# Patient Record
Sex: FEMALE | Race: WHITE | NOT HISPANIC OR LATINO | URBAN - METROPOLITAN AREA
[De-identification: names, ages, dates, MRNs, and addresses within clinical notes are randomized per-mention and may not be internally consistent; named-entity substitution may affect disease eponyms.]

---

## 2023-08-24 LAB — THYROTROPIN (MIU/L) IN SER/PLAS BY DETECTION LIMIT <= 0.05 MIU/L: 1.78 MIU/L (ref 0.44–3.98)

## 2023-08-25 LAB
DEHYDROEPIANDROSTERONE SULFATE (DHEA-S) (UG/DL) IN SER/: 351 UG/DL (ref 65–395)
ESTRADIOL (PG/ML) IN SER/PLAS: 82 PG/ML
FOLLITROPIN (IU/L) IN SER/PLAS: 3.6 IU/L
PROLACTIN (UG/L) IN SER/PLAS: 20.2 UG/L (ref 3–20)

## 2023-08-31 LAB
TESTOSTERONE FREE (CHAN): 3.3 PG/ML (ref 0.1–6.4)
TESTOSTERONE,TOTAL,LC-MS/MS: 29 NG/DL (ref 2–45)

## 2023-10-09 PROCEDURE — 87086 URINE CULTURE/COLONY COUNT: CPT

## 2023-10-10 ENCOUNTER — LAB REQUISITION (OUTPATIENT)
Dept: LAB | Facility: HOSPITAL | Age: 23
End: 2023-10-10
Payer: COMMERCIAL

## 2023-10-10 DIAGNOSIS — R35.0 FREQUENCY OF MICTURITION: ICD-10-CM

## 2023-10-10 DIAGNOSIS — R30.0 DYSURIA: ICD-10-CM

## 2023-10-11 LAB — BACTERIA UR CULT: NO GROWTH

## 2023-10-29 PROBLEM — N93.9 ABNORMAL UTERINE BLEEDING: Status: ACTIVE | Noted: 2023-10-29

## 2023-10-29 PROBLEM — N91.4 SECONDARY OLIGOMENORRHEA: Status: ACTIVE | Noted: 2023-10-29

## 2023-10-29 PROBLEM — J32.9 CLINICAL SINUSITIS: Status: ACTIVE | Noted: 2023-10-29

## 2023-10-29 PROBLEM — R79.89 ELEVATED PROLACTIN LEVEL: Status: ACTIVE | Noted: 2023-10-29

## 2023-10-29 RX ORDER — ADAPALENE AND BENZOYL PEROXIDE 3; 25 MG/G; MG/G
GEL TOPICAL
COMMUNITY
Start: 2019-09-18 | End: 2023-12-12 | Stop reason: ALTCHOICE

## 2023-10-29 RX ORDER — DAPSONE 75 MG/G
GEL TOPICAL
COMMUNITY
Start: 2019-09-18 | End: 2023-12-12 | Stop reason: ALTCHOICE

## 2023-10-29 RX ORDER — NORETHINDRONE ACETATE AND ETHINYL ESTRADIOL 1MG-20(21)
KIT ORAL
COMMUNITY
Start: 2020-08-21 | End: 2023-12-12 | Stop reason: ALTCHOICE

## 2023-12-10 ASSESSMENT — ENCOUNTER SYMPTOMS
ABDOMINAL PAIN: 1
ARTHRALGIAS: 0
CONSTIPATION: 0
FREQUENCY: 0
FLATUS: 0
HEMATOCHEZIA: 0
DYSURIA: 0
HEADACHES: 0
ANOREXIA: 0
VOMITING: 0
MYALGIAS: 0
BELCHING: 0
FEVER: 0
NAUSEA: 0
DIARRHEA: 0
HEMATURIA: 0
WEIGHT LOSS: 0

## 2023-12-12 ENCOUNTER — OFFICE VISIT (OUTPATIENT)
Dept: OBSTETRICS AND GYNECOLOGY | Facility: CLINIC | Age: 23
End: 2023-12-12
Payer: COMMERCIAL

## 2023-12-12 VITALS
DIASTOLIC BLOOD PRESSURE: 70 MMHG | WEIGHT: 146 LBS | HEIGHT: 64 IN | SYSTOLIC BLOOD PRESSURE: 110 MMHG | BODY MASS INDEX: 24.92 KG/M2

## 2023-12-12 DIAGNOSIS — N91.4 SECONDARY OLIGOMENORRHEA: Primary | ICD-10-CM

## 2023-12-12 PROCEDURE — 99213 OFFICE O/P EST LOW 20 MIN: CPT | Performed by: OBSTETRICS & GYNECOLOGY

## 2023-12-12 PROCEDURE — 1036F TOBACCO NON-USER: CPT | Performed by: OBSTETRICS & GYNECOLOGY

## 2023-12-12 RX ORDER — MEDROXYPROGESTERONE ACETATE 10 MG/1
TABLET ORAL
Qty: 30 TABLET | Refills: 3 | Status: SHIPPED | OUTPATIENT
Start: 2023-12-12

## 2023-12-12 ASSESSMENT — PAIN SCALES - GENERAL: PAINLEVEL: 0-NO PAIN

## 2023-12-12 NOTE — PROGRESS NOTES
"Assessment   IMPRESSIONS:    1. Secondary oligomenorrhea  - labs normal in 8/2023 except a mildly elevated prolactin level at 20.2. Recommend repeating a fasting prolactin level.   - no clear etiology for her oligomenorrhea. Discussed options for menstrual regulation. She does not wish to be on anything for contraception. She desires cyclic provera. Rx sent  - medroxyPROGESTERone (Provera) 10 mg tablet; Take 1 tablet by mouth for 10 days at the start of each month.  Dispense: 30 tablet; Refill: 3  - Prolactin; Future    Follow up for preventative visit or sooner PRN    Louisa Harry MD      Subjective   Etta Ellison is a 23 y.o. female here for follow up for irregular menses.    Patient had normal menses in July to October and then has not had one since October. She has had a negative pregnancy test. She reports this has been an ongoing issue whenever she is not on an OCP.     C/o isolated episode of pain during sex in August - lasted about 12 hours      Past medical history, surgical history, social history, family history, medications and allergies were reviewed and edited as needed      Objective   /70   Ht 1.626 m (5' 4\")   Wt 66.2 kg (146 lb)   LMP 10/15/2023   BMI 25.06 kg/m²   No exam performed today      "

## 2025-05-05 ENCOUNTER — APPOINTMENT (OUTPATIENT)
Dept: PRIMARY CARE | Facility: CLINIC | Age: 25
End: 2025-05-05
Payer: COMMERCIAL

## 2025-06-26 ENCOUNTER — APPOINTMENT (OUTPATIENT)
Dept: OBSTETRICS AND GYNECOLOGY | Facility: CLINIC | Age: 25
End: 2025-06-26
Payer: COMMERCIAL

## 2025-08-11 ENCOUNTER — APPOINTMENT (OUTPATIENT)
Dept: OBSTETRICS AND GYNECOLOGY | Facility: CLINIC | Age: 25
End: 2025-08-11
Payer: COMMERCIAL

## 2025-08-21 ENCOUNTER — APPOINTMENT (OUTPATIENT)
Dept: OBSTETRICS AND GYNECOLOGY | Facility: CLINIC | Age: 25
End: 2025-08-21
Payer: COMMERCIAL

## 2025-08-21 VITALS — WEIGHT: 151.6 LBS | SYSTOLIC BLOOD PRESSURE: 100 MMHG | DIASTOLIC BLOOD PRESSURE: 60 MMHG | BODY MASS INDEX: 26.02 KG/M2

## 2025-08-21 DIAGNOSIS — N91.4 SECONDARY OLIGOMENORRHEA: ICD-10-CM

## 2025-08-21 DIAGNOSIS — Z01.419 ENCOUNTER FOR ANNUAL ROUTINE GYNECOLOGICAL EXAMINATION: Primary | ICD-10-CM

## 2025-08-21 PROCEDURE — 99395 PREV VISIT EST AGE 18-39: CPT | Performed by: OBSTETRICS & GYNECOLOGY

## 2025-08-21 PROCEDURE — 1036F TOBACCO NON-USER: CPT | Performed by: OBSTETRICS & GYNECOLOGY

## 2025-08-21 ASSESSMENT — PAIN SCALES - GENERAL: PAINLEVEL_OUTOF10: 0-NO PAIN

## 2025-08-21 ASSESSMENT — PATIENT HEALTH QUESTIONNAIRE - PHQ9
SUM OF ALL RESPONSES TO PHQ9 QUESTIONS 1 AND 2: 0
2. FEELING DOWN, DEPRESSED OR HOPELESS: NOT AT ALL
1. LITTLE INTEREST OR PLEASURE IN DOING THINGS: NOT AT ALL